# Patient Record
Sex: MALE | Race: WHITE | NOT HISPANIC OR LATINO
[De-identification: names, ages, dates, MRNs, and addresses within clinical notes are randomized per-mention and may not be internally consistent; named-entity substitution may affect disease eponyms.]

---

## 2021-10-26 DIAGNOSIS — Z01.818 ENCOUNTER FOR OTHER PREPROCEDURAL EXAMINATION: ICD-10-CM

## 2021-10-26 PROBLEM — Z00.00 ENCOUNTER FOR PREVENTIVE HEALTH EXAMINATION: Status: ACTIVE | Noted: 2021-10-26

## 2021-10-28 ENCOUNTER — APPOINTMENT (OUTPATIENT)
Dept: DISASTER EMERGENCY | Facility: CLINIC | Age: 46
End: 2021-10-28

## 2021-10-28 LAB — SARS-COV-2 N GENE NPH QL NAA+PROBE: NOT DETECTED

## 2022-04-14 ENCOUNTER — APPOINTMENT (OUTPATIENT)
Dept: ORTHOPEDIC SURGERY | Facility: CLINIC | Age: 47
End: 2022-04-14
Payer: OTHER MISCELLANEOUS

## 2022-04-14 VITALS — HEIGHT: 70 IN | BODY MASS INDEX: 22.19 KG/M2 | WEIGHT: 155 LBS

## 2022-04-14 DIAGNOSIS — S83.271D COMPLEX TEAR OF LATERAL MENISCUS, CURRENT INJURY, RIGHT KNEE, SUBSEQUENT ENCOUNTER: ICD-10-CM

## 2022-04-14 DIAGNOSIS — M93.261 OSTEOCHONDRITIS DISSECANS, RIGHT KNEE: ICD-10-CM

## 2022-04-14 DIAGNOSIS — Z78.9 OTHER SPECIFIED HEALTH STATUS: ICD-10-CM

## 2022-04-14 DIAGNOSIS — S83.231D COMPLEX TEAR OF MEDIAL MENISCUS, CURRENT INJURY, RIGHT KNEE, SUBSEQUENT ENCOUNTER: ICD-10-CM

## 2022-04-14 PROCEDURE — 99072 ADDL SUPL MATRL&STAF TM PHE: CPT

## 2022-04-14 PROCEDURE — 99214 OFFICE O/P EST MOD 30 MIN: CPT

## 2022-04-14 NOTE — HISTORY OF PRESENT ILLNESS
[3] : 3 [0] : 0 [Full time] : Work status: full time [] : yes [de-identified] : JEAN CARLOS DOI: 10/12/21 ( WITH [employer])\par DOS: 11/2/21: RIGHT KNEE SCOPE ACL BTB ALLOGRAFT, PMM COMPLETE RADIAL TEAR, PLM COMPLETE RADIAL TEAR, ABRASION LATERAL PLATEAU FOR OCD\par \par 1/13/22: F/U RIGHT KNEE DOING WELL WITH PT\par 10/13/21: 46 YEAR OLD MALE PRESENTS WITH RIGHT KNEE PAIN AND DIFFICULTY AMBULATING AFTER A FALL OFF A BELT  YESTERDAY. HE WAS STEPPING OFF OF THE BELT  ONTO THE STEP WHEN HE FELL OFF AND LANDED ON HIS RIGHT FOOT CAUSING HIS KNEE TO TWIST. THERE WAS NO HANDRAIL ON THE SIDE HE CAME OFF. HE HEARD A POP AND CRUNCH IN THE KNEE. NO HISTORY OF INJURIES TO THE KNEE. HE IS AMBULATING WITH A CANE. PAIN IS CONSTANT. HE IS UNABLE TO FLEX THE KNEE.\par 10/14/21: MRI REVIEW\par 11/4/21: FIRST POSTOP DOING WELL, NO CALF PAIN, NO F/C\par 11/11/21: F/U RIGHT KNEE\par 12/2/21: F/U RIGHT KNEE DOING WELL\par 3/3/22: HERE FOR FOLLOW UP RIGHT KNEE. DOING PT.\par 4/14/22: FOLLOW UP RIGHT KNEE, DOING WELL. WAS UNABLE TO RENEW PT 6 WEEKS AGO WITH WC, HAS BEEN DOING HEP. [FreeTextEntry1] : rt knee [FreeTextEntry5] : s/p 11/02/21 r knee arthroscopic anterior cruciate ligament reconstruction with patellar tendon allograft [de-identified] : insurance denied PT

## 2022-04-14 NOTE — WORK
[Does not reveal pre-existing condition(s) that may affect treatment/prognosis] : does not reveal pre-existing condition(s) that may affect treatment/prognosis [Can return to work without limitations on ______] : can return to work without limitations on [unfilled] [Patient] : patient [I provided the services listed above] :  I provided the services listed above.

## 2022-04-14 NOTE — ASSESSMENT
[FreeTextEntry1] : CONTINUES TO IMPROVE\par C/W HEP\par HE HAS BEEN WORKING FULL DUTY\par RTO 3 MONTHS

## 2022-04-14 NOTE — PHYSICAL EXAM
[5___] : hamstring 5[unfilled]/5 [Negative] : negative posterior draw [Right] : right knee [] : non-antalgic [TWNoteComboBox7] : flexion 130 degrees [de-identified] : extension 0 degrees

## 2022-07-14 ENCOUNTER — APPOINTMENT (OUTPATIENT)
Dept: ORTHOPEDIC SURGERY | Facility: CLINIC | Age: 47
End: 2022-07-14

## 2022-07-14 VITALS — BODY MASS INDEX: 22.19 KG/M2 | WEIGHT: 155 LBS | HEIGHT: 70 IN

## 2022-07-14 PROCEDURE — 99072 ADDL SUPL MATRL&STAF TM PHE: CPT

## 2022-07-14 PROCEDURE — 99214 OFFICE O/P EST MOD 30 MIN: CPT

## 2023-01-24 ENCOUNTER — FORM ENCOUNTER (OUTPATIENT)
Age: 48
End: 2023-01-24

## 2023-05-25 ENCOUNTER — APPOINTMENT (OUTPATIENT)
Dept: ORTHOPEDIC SURGERY | Facility: CLINIC | Age: 48
End: 2023-05-25
Payer: OTHER MISCELLANEOUS

## 2023-05-25 ENCOUNTER — NON-APPOINTMENT (OUTPATIENT)
Age: 48
End: 2023-05-25

## 2023-05-25 VITALS — BODY MASS INDEX: 22.19 KG/M2 | HEIGHT: 70 IN | WEIGHT: 155 LBS

## 2023-05-25 PROCEDURE — 99214 OFFICE O/P EST MOD 30 MIN: CPT | Mod: 57

## 2023-06-05 ENCOUNTER — FORM ENCOUNTER (OUTPATIENT)
Age: 48
End: 2023-06-05

## 2023-06-20 NOTE — DATA REVIEWED
[MRI] : MRI [I independently reviewed and interpreted images and report] : I independently reviewed and interpreted images and report [I reviewed the films/CD and agree] : I reviewed the films/CD and agree [FreeTextEntry1] : 12/21/22  tear atfl

## 2023-06-20 NOTE — HISTORY OF PRESENT ILLNESS
[7] : 7 [4] : 4 [Sharp] : sharp [Constant] : constant [de-identified] :   12/19/22\par \par Pt is a 48 year old M who presents today for evaluation of their left ankle. Pt states that he fell off a 2-3 feet platform at [employer] 12/19/22. No prior injury. He is here for a second opinion and would like to talk about Sx, finally got it approved. Was seen by Dr. Gray. Pain localized along the lateral ankle. Had MRI take which showed a torn ligament in ankle. Denies previous injury. No numbness/tingling. Did PT, cortisone injection, ice, and wrapped the affected area. WB in Sneakers. [FreeTextEntry1] : Left Ankle  [FreeTextEntry6] : strain

## 2023-06-20 NOTE — PHYSICAL EXAM
[Left] : left foot and ankle [NL (20)] : dorsiflexion 20 degrees [NL (40)] : plantar flexion 40 degrees [5___] : eversion 5[unfilled]/5 [] : no deltoid ligament tenderness

## 2023-06-20 NOTE — WORK
[Sprain/Strain] : sprain/strain [Was the competent medical cause of the injury] : was the competent medical cause of the injury [Are consistent with the injury] : are consistent with the injury [Consistent with my objective findings] : consistent with my objective findings [Partial] : partial [Does not reveal pre-existing condition(s) that may affect treatment/prognosis] : does not reveal pre-existing condition(s) that may affect treatment/prognosis [No Rx restrictions] : No Rx restrictions. [I provided the services listed above] :  I provided the services listed above. [FreeTextEntry1] : fair

## 2023-06-20 NOTE — DISCUSSION/SUMMARY
[Surgical risks reviewed] : Surgical risks reviewed [de-identified] : The risks, benefits, alternatives have been discussed.  The risks include but are not limited to infection, bleeding, injury to small nerves and blood vessels, pain, stiffness, progression, dvt, PE, amputation and death.\par \par Patients condition prohibits him from performing the full duies of his job and puts him at increased risk for additional injury.\par

## 2023-07-11 ENCOUNTER — APPOINTMENT (OUTPATIENT)
Dept: ORTHOPEDIC SURGERY | Facility: CLINIC | Age: 48
End: 2023-07-11
Payer: OTHER MISCELLANEOUS

## 2023-07-11 DIAGNOSIS — S93.492A SPRAIN OF OTHER LIGAMENT OF LEFT ANKLE, INITIAL ENCOUNTER: ICD-10-CM

## 2023-07-11 DIAGNOSIS — S83.511D SPRAIN OF ANTERIOR CRUCIATE LIGAMENT OF RIGHT KNEE, SUBSEQUENT ENCOUNTER: ICD-10-CM

## 2023-07-11 PROCEDURE — 99213 OFFICE O/P EST LOW 20 MIN: CPT

## 2023-07-11 NOTE — DISCUSSION/SUMMARY
[Surgical risks reviewed] : Surgical risks reviewed [de-identified] : The risks, benefits, alternatives have been discussed.  The risks include but are not limited to infection, bleeding, injury to small nerves and blood vessels, pain, stiffness, progression, dvt, PE, amputation and death.\par \par Repeat request authorization for ankle arthroscopy to evaluate joint and lateral ankle reconstruction.  This is the current gold standard for treating ankle instability.\par

## 2023-07-11 NOTE — HISTORY OF PRESENT ILLNESS
[7] : 7 [4] : 4 [Sharp] : sharp [Constant] : constant [de-identified] : WC  12/19/22\par \par Pt is a 48 year old M who presents today for evaluation of their left ankle. Pt states that he fell off a 2-3 feet platform at [employer] 12/19/22. No prior injury. He is here for a second opinion and would like to talk about Sx, finally got it approved. Was seen by Dr. Gray. Pain localized along the lateral ankle. Had MRI take which showed a torn ligament in ankle. Denies previous injury. No numbness/tingling. Did PT, cortisone injection, ice, and wrapped the affected area. WB in Sneakers.\par \par 07/11/23: F/U Left Ankle. Still waiting for the insurance company to approve Sx. Pt states pain level stayed the same since last visit.  [FreeTextEntry1] : Left Ankle  [FreeTextEntry6] : strain

## 2023-08-24 RX ORDER — HYDROCODONE BITARTRATE AND ACETAMINOPHEN 5; 325 MG/1; MG/1
5-325 TABLET ORAL
Qty: 20 | Refills: 0 | Status: ACTIVE | COMMUNITY
Start: 2023-08-24 | End: 1900-01-01

## 2023-08-28 ENCOUNTER — APPOINTMENT (OUTPATIENT)
Age: 48
End: 2023-08-28
Payer: OTHER MISCELLANEOUS

## 2023-08-28 PROCEDURE — 29515 APPLICATION SHORT LEG SPLINT: CPT | Mod: 59,LT

## 2023-08-28 PROCEDURE — 20605 DRAIN/INJ JOINT/BURSA W/O US: CPT | Mod: 59,LT

## 2023-08-28 PROCEDURE — 29897 ANKLE ARTHROSCOPY/SURGERY: CPT | Mod: LT

## 2023-08-28 PROCEDURE — 27698 REPAIR OF ANKLE LIGAMENT: CPT | Mod: 59,LT

## 2023-08-28 RX ORDER — IBUPROFEN 800 MG/1
800 TABLET, FILM COATED ORAL 3 TIMES DAILY
Qty: 30 | Refills: 0 | Status: ACTIVE | COMMUNITY
Start: 2023-08-28 | End: 1900-01-01

## 2023-08-29 ENCOUNTER — APPOINTMENT (OUTPATIENT)
Dept: ORTHOPEDIC SURGERY | Facility: CLINIC | Age: 48
End: 2023-08-29

## 2023-09-05 ENCOUNTER — APPOINTMENT (OUTPATIENT)
Dept: ORTHOPEDIC SURGERY | Facility: CLINIC | Age: 48
End: 2023-09-05
Payer: OTHER MISCELLANEOUS

## 2023-09-05 PROCEDURE — 29425 APPL SHORT LEG CAST WALKING: CPT | Mod: 58,LT

## 2023-09-05 PROCEDURE — 99024 POSTOP FOLLOW-UP VISIT: CPT

## 2023-09-05 NOTE — HISTORY OF PRESENT ILLNESS
[7] : 7 [4] : 4 [Sharp] : sharp [Constant] : constant [de-identified] : WC  12/19/22  Pt is a 48 year old M who presents today for evaluation of their left ankle. Pt states that he fell off a 2-3 feet platform at [employer] 12/19/22. No prior injury. He is here for a second opinion and would like to talk about Sx, finally got it approved. Was seen by Dr. Gray. Pain localized along the lateral ankle. Had MRI take which showed a torn ligament in ankle. Denies previous injury. No numbness/tingling. Did PT, cortisone injection, ice, and wrapped the affected area. WB in Sneakers.  07/11/23: F/U Left Ankle. Still waiting for the insurance company to approve Sx. Pt states pain level stayed the same since last visit.   8/28/23: L ankle scope/brostrum valdez   09/05/2023: f/u L ankle; Patient is here for PO #1. Denies fever, chills, nausea, vomiting. Pain is manageable. NWB in splint.   [FreeTextEntry1] : Left Ankle  [FreeTextEntry6] : strain

## 2023-09-05 NOTE — DISCUSSION/SUMMARY
[de-identified] : A well padded short leg fiberglass cast was applied (weightbearing).  Patient was instructed on proper cast management including keeping it dry and not sticking anything down the cast.  Patient was told that if pain significantly increases or toes turn numb and/or blue and simple elevation does not allow symptoms to improve in a few minutes, to call the office immediately or go to the ER.  All questions were answered.  cast/suture removal next visit

## 2023-09-20 ENCOUNTER — NON-APPOINTMENT (OUTPATIENT)
Age: 48
End: 2023-09-20

## 2023-09-26 ENCOUNTER — APPOINTMENT (OUTPATIENT)
Dept: ORTHOPEDIC SURGERY | Facility: CLINIC | Age: 48
End: 2023-09-26
Payer: OTHER MISCELLANEOUS

## 2023-09-26 PROCEDURE — 99024 POSTOP FOLLOW-UP VISIT: CPT

## 2023-09-26 PROCEDURE — L4350: CPT | Mod: LT

## 2023-10-24 ENCOUNTER — APPOINTMENT (OUTPATIENT)
Dept: ORTHOPEDIC SURGERY | Facility: CLINIC | Age: 48
End: 2023-10-24
Payer: OTHER MISCELLANEOUS

## 2023-10-24 PROCEDURE — 99213 OFFICE O/P EST LOW 20 MIN: CPT | Mod: 24

## 2023-10-24 PROCEDURE — 99024 POSTOP FOLLOW-UP VISIT: CPT

## 2023-11-28 ENCOUNTER — NON-APPOINTMENT (OUTPATIENT)
Age: 48
End: 2023-11-28

## 2023-11-28 ENCOUNTER — APPOINTMENT (OUTPATIENT)
Dept: ORTHOPEDIC SURGERY | Facility: CLINIC | Age: 48
End: 2023-11-28
Payer: OTHER MISCELLANEOUS

## 2023-11-28 PROCEDURE — 99213 OFFICE O/P EST LOW 20 MIN: CPT

## 2024-01-11 ENCOUNTER — APPOINTMENT (OUTPATIENT)
Dept: ORTHOPEDIC SURGERY | Facility: CLINIC | Age: 49
End: 2024-01-11
Payer: OTHER MISCELLANEOUS

## 2024-01-11 PROCEDURE — 99213 OFFICE O/P EST LOW 20 MIN: CPT

## 2024-01-11 NOTE — HISTORY OF PRESENT ILLNESS
[4] : 4 [Dull/Aching] : dull/aching [Constant] : constant [de-identified] : WC  12/19/22  Pt is a 48 year old M who presents today for evaluation of their left ankle. Pt states that he fell off a 2-3 feet platform at [employer] 12/19/22. No prior injury. He is here for a second opinion and would like to talk about Sx, finally got it approved. Was seen by Dr. Gray. Pain localized along the lateral ankle. Had MRI take which showed a torn ligament in ankle. Denies previous injury. No numbness/tingling. Did PT, cortisone injection, ice, and wrapped the affected area. WB in Sneakers.  07/11/23: F/U Left Ankle. Still waiting for the insurance company to approve Sx. Pt states pain level stayed the same since last visit.   8/28/23: L ankle scope/brostrum valdez   09/05/2023: f/u L ankle; Patient is here for PO #1. Denies fever, chills, nausea, vomiting. Pain is manageable. NWB in splint.  09/26/23: F/U L ankle. 2nd post op visit. Pt states he is feeling a lot better. WB cast and postt op shoe.  10/24/23: Patient is here for 3rd post-op ob left ankle. states he was approved for 6 more PT sessions.  Not working 11/28/2023 F/U: Left Ankle. Pt states he is feeling better with minimal soreness. Got denied for more PT visits. Working Partial duty. Consistent with home exercise and, playing handball, and the bike.  01/11/24: follow up on left ankle. Returned back to work full duty. Has some lateral ankle pain at night after work. Wb in sneakers  [FreeTextEntry1] : Left Ankle  [FreeTextEntry6] : soreness

## 2024-01-11 NOTE — DISCUSSION/SUMMARY
[de-identified] : Cont HEP Working full duty Getting more lateral ankle pain now that he has returned to work  He feels better with PT - will request more sessions to aide in balance  f/u 3 months

## 2024-01-11 NOTE — WORK
[Sprain/Strain] : sprain/strain [Was the competent medical cause of the injury] : was the competent medical cause of the injury [Are consistent with the injury] : are consistent with the injury [Consistent with my objective findings] : consistent with my objective findings [Partial] : partial [Does not reveal pre-existing condition(s) that may affect treatment/prognosis] : does not reveal pre-existing condition(s) that may affect treatment/prognosis [Can return to work without limitations on ______] : can return to work without limitations on [unfilled] [No Rx restrictions] : No Rx restrictions. [I provided the services listed above] :  I provided the services listed above. [FreeTextEntry1] : fair

## 2024-01-11 NOTE — PHYSICAL EXAM
[Left] : left foot and ankle [NL (20)] : dorsiflexion 20 degrees [NL (40)] : plantar flexion 40 degrees [2+] : dorsalis pedis pulse: 2+ [5___] : inversion 5[unfilled]/5 [] : negative anterior drawer at ankle

## 2024-03-21 ENCOUNTER — APPOINTMENT (OUTPATIENT)
Dept: ORTHOPEDIC SURGERY | Facility: CLINIC | Age: 49
End: 2024-03-21
Payer: OTHER MISCELLANEOUS

## 2024-03-21 PROCEDURE — 99213 OFFICE O/P EST LOW 20 MIN: CPT

## 2024-03-21 NOTE — WORK
[Sprain/Strain] : sprain/strain [Was the competent medical cause of the injury] : was the competent medical cause of the injury [Are consistent with the injury] : are consistent with the injury [Consistent with my objective findings] : consistent with my objective findings [Does not reveal pre-existing condition(s) that may affect treatment/prognosis] : does not reveal pre-existing condition(s) that may affect treatment/prognosis [Partial] : partial [No Rx restrictions] : No Rx restrictions. [Can return to work without limitations on ______] : can return to work without limitations on [unfilled] [I provided the services listed above] :  I provided the services listed above. [FreeTextEntry1] : fair

## 2024-03-21 NOTE — PHYSICAL EXAM
[Left] : left foot and ankle [NL (40)] : plantar flexion 40 degrees [NL (20)] : dorsiflexion 20 degrees [5___] : eversion 5[unfilled]/5 [2+] : dorsalis pedis pulse: 2+ [] : no achilles tendon insertion tenderness [de-identified] : SHR X 5

## 2024-03-21 NOTE — HISTORY OF PRESENT ILLNESS
[0] : 0 [Constant] : constant [de-identified] : WC  12/19/22  Pt is a 48 year old M who presents today for evaluation of their left ankle. Pt states that he fell off a 2-3 feet platform at [employer] 12/19/22. No prior injury. He is here for a second opinion and would like to talk about Sx, finally got it approved. Was seen by Dr. Gray. Pain localized along the lateral ankle. Had MRI take which showed a torn ligament in ankle. Denies previous injury. No numbness/tingling. Did PT, cortisone injection, ice, and wrapped the affected area. WB in Sneakers.  07/11/23: F/U Left Ankle. Still waiting for the insurance company to approve Sx. Pt states pain level stayed the same since last visit.   8/28/23: L ankle scope/brostrum valdez   09/05/2023: f/u L ankle; Patient is here for PO #1. Denies fever, chills, nausea, vomiting. Pain is manageable. NWB in splint.  09/26/23: F/U L ankle. 2nd post op visit. Pt states he is feeling a lot better. WB cast and postt op shoe.  10/24/23: Patient is here for 3rd post-op ob left ankle. states he was approved for 6 more PT sessions.  Not working 11/28/2023 F/U: Left Ankle. Pt states he is feeling better with minimal soreness. Got denied for more PT visits. Working Partial duty. Consistent with home exercise and, playing handball, and the bike.  01/11/24: follow up on left ankle. Returned back to work full duty. Has some lateral ankle pain at night after work. Wb in sneakers 03/21/24: follow up on left ankle. States no pain today. Wb in sneakers. He is running, feels 100% improved.  He is working.   [FreeTextEntry1] : Left Ankle  [FreeTextEntry6] : soreness

## 2024-06-21 ENCOUNTER — APPOINTMENT (OUTPATIENT)
Dept: ORTHOPEDIC SURGERY | Facility: CLINIC | Age: 49
End: 2024-06-21
Payer: OTHER MISCELLANEOUS

## 2024-06-21 VITALS — WEIGHT: 155 LBS | BODY MASS INDEX: 22.19 KG/M2 | HEIGHT: 70 IN

## 2024-06-21 DIAGNOSIS — S93.492D SPRAIN OF OTHER LIGAMENT OF LEFT ANKLE, SUBSEQUENT ENCOUNTER: ICD-10-CM

## 2024-06-21 DIAGNOSIS — M25.372 OTHER INSTABILITY, LEFT ANKLE: ICD-10-CM

## 2024-06-21 PROCEDURE — 99212 OFFICE O/P EST SF 10 MIN: CPT

## 2024-06-21 NOTE — PHYSICAL EXAM
[Left] : left foot and ankle [NL (20)] : dorsiflexion 20 degrees [NL (40)] : plantar flexion 40 degrees [5___] : eversion 5[unfilled]/5 [2+] : dorsalis pedis pulse: 2+ [] : negative anterior drawer at ankle [de-identified] : SHR X 5

## 2024-06-21 NOTE — HISTORY OF PRESENT ILLNESS
[0] : 0 [Constant] : constant [Full time] : Work status: full time [] : yes [de-identified] : WC  12/19/22  Pt is a 48 year old M who presents today for evaluation of their left ankle. Pt states that he fell off a 2-3 feet platform at [employer] 12/19/22. No prior injury. He is here for a second opinion and would like to talk about Sx, finally got it approved. Was seen by Dr. Gray. Pain localized along the lateral ankle. Had MRI take which showed a torn ligament in ankle. Denies previous injury. No numbness/tingling. Did PT, cortisone injection, ice, and wrapped the affected area. WB in Sneakers.  07/11/23: F/U Left Ankle. Still waiting for the insurance company to approve Sx. Pt states pain level stayed the same since last visit.   8/28/23: L ankle scope/brostrum valdez   09/05/2023: f/u L ankle; Patient is here for PO #1. Denies fever, chills, nausea, vomiting. Pain is manageable. NWB in splint.  09/26/23: F/U L ankle. 2nd post op visit. Pt states he is feeling a lot better. WB cast and postt op shoe.  10/24/23: Patient is here for 3rd post-op ob left ankle. states he was approved for 6 more PT sessions.  Not working 11/28/2023 F/U: Left Ankle. Pt states he is feeling better with minimal soreness. Got denied for more PT visits. Working Partial duty. Consistent with home exercise and, playing handball, and the bike.  01/11/24: follow up on left ankle. Returned back to work full duty. Has some lateral ankle pain at night after work. Wb in sneakers 03/21/24: follow up on left ankle. States no pain today. Wb in sneakers. He is running, feels 100% improved.  He is working.  6/21/24: follow up on left ankle. states pain is improving. wb in sneaker.  biking/playing sports. working full duty.  [FreeTextEntry1] : Left Ankle  [FreeTextEntry6] : soreness

## 2024-08-06 ENCOUNTER — APPOINTMENT (OUTPATIENT)
Dept: ORTHOPEDIC SURGERY | Facility: CLINIC | Age: 49
End: 2024-08-06

## 2024-08-06 PROCEDURE — 99243 OFF/OP CNSLTJ NEW/EST LOW 30: CPT

## 2024-08-06 PROCEDURE — 99455 WORK RELATED DISABILITY EXAM: CPT

## 2024-08-06 NOTE — HISTORY OF PRESENT ILLNESS
[0] : 0 [Constant] : constant [Full time] : Work status: full time [] : yes [de-identified] : WC  12/19/22  Pt is a 48 year old M who presents today for evaluation of their left ankle. Pt states that he fell off a 2-3 feet platform at [employer] 12/19/22. No prior injury. He is here for a second opinion and would like to talk about Sx, finally got it approved. Was seen by Dr. Gray. Pain localized along the lateral ankle. Had MRI take which showed a torn ligament in ankle. Denies previous injury. No numbness/tingling. Did PT, cortisone injection, ice, and wrapped the affected area. WB in Sneakers.  07/11/23: F/U Left Ankle. Still waiting for the insurance company to approve Sx. Pt states pain level stayed the same since last visit.   8/28/23: L ankle scope/brostrum valdez   09/05/2023: f/u L ankle; Patient is here for PO #1. Denies fever, chills, nausea, vomiting. Pain is manageable. NWB in splint.  09/26/23: F/U L ankle. 2nd post op visit. Pt states he is feeling a lot better. WB cast and postt op shoe.  10/24/23: Patient is here for 3rd post-op ob left ankle. states he was approved for 6 more PT sessions.  Not working 11/28/2023 F/U: Left Ankle. Pt states he is feeling better with minimal soreness. Got denied for more PT visits. Working Partial duty. Consistent with home exercise and, playing handball, and the bike.  01/11/24: follow up on left ankle. Returned back to work full duty. Has some lateral ankle pain at night after work. Wb in sneakers 03/21/24: follow up on left ankle. States no pain today. Wb in sneakers. He is running, feels 100% improved.  He is working.  6/21/24: follow up on left ankle. states pain is improving. wb in sneaker.  biking/playing sports. working full duty.  08/06/2024: F/U: Left Ankle.SLU  Working reg [FreeTextEntry1] : Left Ankle  [FreeTextEntry6] : soreness

## 2024-08-06 NOTE — PHYSICAL EXAM
[Left] : left foot and ankle [NL (20)] : dorsiflexion 20 degrees [NL (40)] : plantar flexion 40 degrees [5___] : eversion 5[unfilled]/5 [2+] : dorsalis pedis pulse: 2+ [] : negative anterior drawer at ankle [de-identified] : SHR X 5

## 2024-08-06 NOTE — WORK
[Sprain/Strain] : sprain/strain [Was the competent medical cause of the injury] : was the competent medical cause of the injury [Are consistent with the injury] : are consistent with the injury [Consistent with my objective findings] : consistent with my objective findings [No Rx restrictions] : No Rx restrictions. [I provided the services listed above] :  I provided the services listed above. [FreeTextEntry1] : fair [Has the patient reached Maximum Medical Improvement? If yes, indicate date___] : Yes, on [unfilled] [Is there permanent partial impairment?] : Yes [FreeTextEntry6] : L ankle [Left] : left [No ___________] : No: [unfilled] [FreeTextEntry8] : Df 20  PF 40 [de-identified] : s/p lateral ankle recon [FreeTextEntry5] : 7.5%